# Patient Record
Sex: MALE | Employment: OTHER | URBAN - METROPOLITAN AREA
[De-identification: names, ages, dates, MRNs, and addresses within clinical notes are randomized per-mention and may not be internally consistent; named-entity substitution may affect disease eponyms.]

---

## 2017-01-30 ENCOUNTER — HOSPITAL ENCOUNTER (OUTPATIENT)
Dept: DIABETES SERVICES | Age: 52
Discharge: HOME | End: 2017-01-30
Payer: COMMERCIAL

## 2017-01-30 DIAGNOSIS — Z79.4 UNCONTROLLED TYPE 2 DIABETES MELLITUS WITH HYPERGLYCEMIA, WITH LONG-TERM CURRENT USE OF INSULIN (HCC): ICD-10-CM

## 2017-01-30 DIAGNOSIS — E11.65 UNCONTROLLED TYPE 2 DIABETES MELLITUS WITH HYPERGLYCEMIA, WITH LONG-TERM CURRENT USE OF INSULIN (HCC): ICD-10-CM

## 2017-01-30 PROCEDURE — G0109 DIAB MANAGE TRN IND/GROUP: HCPCS | Performed by: DIETITIAN, REGISTERED

## 2017-03-02 ENCOUNTER — OFFICE VISIT (OUTPATIENT)
Dept: ENDOCRINOLOGY | Age: 52
End: 2017-03-02

## 2017-03-02 VITALS
SYSTOLIC BLOOD PRESSURE: 128 MMHG | DIASTOLIC BLOOD PRESSURE: 82 MMHG | WEIGHT: 210.6 LBS | BODY MASS INDEX: 29.48 KG/M2 | HEART RATE: 69 BPM | HEIGHT: 71 IN

## 2017-03-02 DIAGNOSIS — E11.9 TYPE 2 DIABETES MELLITUS WITHOUT COMPLICATION, WITHOUT LONG-TERM CURRENT USE OF INSULIN (HCC): Primary | ICD-10-CM

## 2017-03-02 DIAGNOSIS — E78.2 MIXED HYPERLIPIDEMIA: ICD-10-CM

## 2017-03-02 RX ORDER — ATORVASTATIN CALCIUM 20 MG/1
TABLET, FILM COATED ORAL
Qty: 30 TAB | Refills: 11 | Status: SHIPPED | OUTPATIENT
Start: 2017-03-02 | End: 2017-09-12 | Stop reason: SDUPTHER

## 2017-03-02 RX ORDER — BLOOD SUGAR DIAGNOSTIC
STRIP MISCELLANEOUS
Refills: 3 | COMMUNITY
Start: 2017-01-17 | End: 2017-12-20 | Stop reason: SDUPTHER

## 2017-03-02 RX ORDER — ATORVASTATIN CALCIUM 20 MG/1
TABLET, FILM COATED ORAL
Refills: 6 | COMMUNITY
Start: 2017-02-07 | End: 2017-03-02 | Stop reason: SDUPTHER

## 2017-03-02 NOTE — PROGRESS NOTES
Chief Complaint   Patient presents with    Diabetes     pcp and pharmacy verified   Records reviewed  History of Present Illness: Uday Vega is a 46 y.o. male who I was asked to see in consult by Dr. Christianne Childers, for evaluation of diabetes. Was diagnosed with diabetes in November 2016. He has lost 50 pounds and was having issues of polyuria and polydipsia. He went to see his PCP and his BG was >600. Current regimen is Janumet 50/1000mg BID. He was previously or Dapagliflozin, but this caused dysuria so he stopped. Checks blood sugars twice daily. His FBGs are typically in the 100-130's though he has had some BGs in the 150-200's based on what he ate the night before. Most recent Hgb A1c was 7.2% in January 2017. Pt notes that in November 2016 his A1C was 14.4%, he has since lost 50 pounds and has been attending classes with the [facility]. Will request records from Dr. Kvng Rodas. A typical day is as follows:  Pt wakes around 6AM  - breakfast: He has breakfast around 7AM, yesterday he had eggs and coffee (1/2-1/2 and honey)  - He does not have a mid-morning snack.  - lunch: He has lunch around Noon-1PM, yesterday he had chicken salad (no bread) no side items and water  - He will have an afternoon snack of cashews.  - dinner: He has dinner around 7PM, last night he had chicken noodle soup and water. - He may occasionally have some deli meat for a snack in the evening. He has made a lot of changes to his diet including reducing portions and eating less pasta stopped drinking sodas. 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 He walks 2-3 miles most days. 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 He notes his energy has improved since he lost 50+ pounds. His goal is 190. No history of vascular disease. No history of retinopathy, neuropathy, or nephropathy. Last eye exam was November 2016.  He notes he needed new glasses because of changes in his BGs. Current Outpatient Prescriptions   Medication Sig    ONETOUCH ULTRA TEST strip USE AS DIRECTED    SITagliptin-metFORMIN (JANUMET) 50-1,000 mg per tablet Take 1 Tab by mouth two (2) times daily (with meals).  atorvastatin (LIPITOR) 20 mg tablet TAKE 1 TABLET ONCE A DAY ORALLY 30 DAY(S)     No current facility-administered medications for this visit. Allergies   Allergen Reactions    Erythromycin Anaphylaxis    Farxiga [Dapagliflozin] Other (comments)     Dysuria. Very uncomfortable     Family History   Problem Relation Age of Onset    No Known Problems Mother     Elevated Lipids Father     Kidney Disease Brother      s/p renal transplant (NSAIDS)    Diabetes Paternal Aunt     Diabetes Paternal Uncle     Cancer Maternal Grandfather      Prostate    Diabetes Paternal Grandfather      Social History     Social History    Marital status: UNKNOWN     Spouse name: N/A    Number of children: N/A    Years of education: N/A     Occupational History    Not on file.      Social History Main Topics    Smoking status: Current Some Day Smoker    Smokeless tobacco: Not on file      Comment: Cigars 2 days per week    Alcohol use 1.2 oz/week     2 Glasses of wine per week    Drug use: No    Sexual activity: Not on file     Other Topics Concern    Not on file     Social History Narrative     Review of Systems:  - Constitutional Symptoms: no fevers, chills, weight loss  - Eyes: no blurry vision or double vision  - Cardiovascular: no chest pain or palpitations  - Respiratory: no cough or shortness of breath  - Gastrointestinal: no dysphagia or abdominal pain  - Musculoskeletal: no joint pains or weakness  - Integumentary: no rashes  - Neurological: no numbness, tingling, or headaches  - Psychiatric: no depression or anxiety  - Endocrine: no heat or cold intolerance, no polyuria or polydipsia    Physical Examination:  Blood pressure 128/82, pulse 69, height 5' 11\" (1.803 m), weight 210 lb 9.6 oz (95.5 kg). - General: pleasant, no distress, good eye contact  - HEENT: no exopthalmos, no periorbital edema, no scleral/conjunctival injection, EOMI, no lid lag or stare  - Neck: supple, no thyromegaly, masses, lymph nodes, or carotid bruits, no supraclavicular or dorsocervical fat pads  - Cardiovascular: regular, normal rate, normal S1 and S2, no murmurs/rubs/gallops, 2+ dorsalis pedis pulses bilaterally  - Respiratory: clear to auscultation bilaterally  - Gastrointestinal: soft, nontender, nondistended, no masses, no hepatosplenomegaly  - Musculoskeletal: no proximal muscle weakness in upper or lower extremities  - Integumentary: no acanthosis nigricans, no abdominal striae, no rashes, no edema, no foot ulcers  - Neurological: intact sensation to monofilament 4/4 locations, intact vibratory sensation at great toes bilaterally,   - Psychiatric: normal mood and affect    Data Reviewed:   - Hgb A1c 7.2%  - lipids: total 281 ,  HDL 34, , LDL x  - ALT 13, AST 13  - BUN/Cr 17/1.0  - TSH 2.288    Assessment/Plan:   1. Type 2 diabetes mellitus without complication, without long-term current use of insulin ([address]    2. Mixed hyperlipidemia    1) Pt's A1C has improved with lifestyle changes and the Janumet. Pt encouraged to keep up the good work and continue the Januvia/Metformin 50/1000mg BID. Will check a fructosamine and Urine MA today. Pt to check his BGs twice daily. 2) Pt is taking the Atorvastatin 20mg daily, which he is tolerating well. With no hx of CAD and the recommendation is moderate intensity statin, which Atorvastatin 20mg fulfills. Pt voices understanding and agreement with the plan. RTC 3 months      Follow-up Disposition:  Return in about 3 months (around 6/2/2017).     Copy sent to:  Dr. Joan Van

## 2017-03-02 NOTE — LETTER
3/2/2017 10:30 AM 
 
Patient:  Broderick Polanco YOB: 1965 Date of Visit: 3/2/2017 Dear Aamir Martin MD 
[facility] [address] VIA Facsimile: [phone] 
 : Thank you for referring Mr. Broderick Polanco to me for evaluation/treatment. Below are the relevant portions of my assessment and plan of care. If you have questions, please do not hesitate to call me. I look forward to following Mr. Savita Todd along with you. Sincerely, Jacqueline Cunha MD

## 2017-03-02 NOTE — MR AVS SNAPSHOT
Visit Information Date & Time Provider Department Dept. Phone Encounter #  
 3/2/2017  9:50 AM Destinee Russell [facility] End[facility] 21  Follow-up Instructions Return in about 3 months (around 6/2/2017). Upcoming Health Maintenance Date Due Hepatitis C Screening 1965 HEMOGLOBIN A1C Q6M 1965 LIPID PANEL Q1 1965 FOOT EXAM Q1 7/10/1973 MICROALBUMIN Q1 7/10/1973 EYE EXAM RETINAL OR DILATED Q1 7/10/1973 Pneumococcal 19-64 Medium Risk (1 of 1 - PPSV23) 7/10/1982 DTaP/Tdap/Td series (1 - Tdap) 7/10/1984 FOBT Q 1 YEAR AGE 50-75 7/10/2013 INFLUENZA AGE 9 TO ADULT 8/1/2016 Allergies as of 3/2/2017  Review Complete On: 3/2/2017 By: Oneil Carrington MD  
  
 Severity Noted Reaction Type Reactions Erythromycin High 03/28/2016    Anaphylaxis Nydia Heckeiffer  03/02/2017    Other (comments) Dysuria. Very uncomfortable Current Immunizations  Never Reviewed No immunizations on file. Not reviewed this visit You Were Diagnosed With   
  
 Codes Comments Type 2 diabetes mellitus without complication, without long-term current use of insulin (HCC)    -  Primary ICD-10-CM: E11.9 ICD-9-CM: 250.00 Mixed hyperlipidemia     ICD-10-CM: E78.2 ICD-9-CM: 272.2 Vitals BP  
  
  
  
  
  
 128/82 Vitals History BMI and BSA Data Body Mass Index Body Surface Area  
 29.37 kg/m 2 2.19 m 2 Preferred Pharmacy Pharmacy Name Phone [id number] [address] [address] [place] [address] [phone] Your Updated Medication List  
  
   
This list is accurate as of: 3/2/17 10:30 AM.  Always use your most recent med list.  
  
  
  
  
 atorvastatin 20 mg tablet Commonly known as:  LIPITOR  
TAKE 1 TABLET ONCE A DAY ORALLY 30 DAY(S) ONETOUCH ULTRA TEST strip Generic drug:  glucose blood VI test strips USE AS DIRECTED SITagliptin-metFORMIN 50-1,000 mg per tablet Commonly known as:  Hernan Sheth Take 1 Tab by mouth two (2) times daily (with meals). Prescriptions Sent to Pharmacy Refills SITagliptin-metFORMIN (JANUMET) 50-1,000 mg per tablet 3 Sig: Take 1 Tab by mouth two (2) times daily (with meals). Class: Normal  
 Pharmacy: [facility]/pharmacy #[id number] Ramon [address], [address] Ph #: [phone] Route: Oral  
 atorvastatin (LIPITOR) 20 mg tablet 11 Sig: TAKE 1 TABLET ONCE A DAY ORALLY 30 DAY(S) Class: Normal  
 Pharmacy: [facility]/pharmacy #[id number] Ramon Boone, [address] Ph #: [phone] We Performed the Following FRUCTOSAMINE [75803 CPT(R)] HM DIABETES FOOT EXAM [HM7 Custom] MICROALBUMIN, UR, RAND W/ MICROALBUMIN/CREA RATIO [id number] CPT(R)] DC COLLECTION VENOUS BLOOD,VENIPUNCTURE Z2376359 CPT(R)] DC HANDLG&/OR CONVEY OF SPEC FOR TR OFFICE TO LAB [33441 CPT(R)] Follow-up Instructions Return in about 3 months (around 6/2/2017). To-Do List   
 07/14/2017 9:00 AM  
  Appointment with [facility] DIABETES CLASS #4 at [address] [place] [address] ([phone]) Introducing [facility]! Cheyenne Carr introduces [employer] patient portal. Now you can access parts of your medical record, email your doctor's office, and request medication refills online. 1. In your internet browser, go to [email or web address] 2. Click on the First Time User? Click Here link in the Sign In box. You will see the New Member Sign Up page. 3. Enter your [employer] Access Code exactly as it appears below. You will not need to use this code after youve completed the sign-up process. If you do not sign up before the expiration date, you must request a new code. · [employer] Access Code: [email or web address] Expires: 3/5/2017  2:31 PM 
 
 4. Enter the last four digits of your Social Security Number (xxxx) and Date of Birth (mm/dd/yyyy) as indicated and click Submit. You will be taken to the next sign-up page. 5. Create a [employer] ID. This will be your [employer] login ID and cannot be changed, so think of one that is secure and easy to remember. 6. Create a [employer] password. You can change your password at any time. 7. Enter your Password Reset Question and Answer. This can be used at a later time if you forget your password. 8. Enter your e-mail address. You will receive e-mail notification when new information is available in [address] 9. Click Sign Up. You can now view and download portions of your medical record. 10. Click the Download Summary menu link to download a portable copy of your medical information. If you have questions, please visit the Frequently Asked Questions section of the [employer] website. Remember, [employer] is NOT to be used for urgent needs. For medical emergencies, dial 911. Now available from your iPhone and Android! Please provide this summary of care documentation to your next provider. Your primary care clinician is listed as Nai Monk. If you have any questions after today's visit, please call [phone].

## 2017-03-03 LAB
ALBUMIN/CREAT UR: 5.9 MG/G CREAT (ref 0–30)
CREAT UR-MCNC: 109 MG/DL
FRUCTOSAMINE SERPL-SCNC: 265 UMOL/L (ref 0–285)
MICROALBUMIN UR-MCNC: 6.4 UG/ML

## 2017-03-03 NOTE — PROGRESS NOTES
Spoke with pt regarding his labs. No evidence of microalbuminuria. His Fructosamine of 265 is equivalent to an A1C of about 6.5% or an average blood sugar of around 135. Pt encouraged to keep up the good work!

## 2017-09-07 ENCOUNTER — OFFICE VISIT (OUTPATIENT)
Dept: ENDOCRINOLOGY | Age: 52
End: 2017-09-07

## 2017-09-07 VITALS
BODY MASS INDEX: 31.89 KG/M2 | DIASTOLIC BLOOD PRESSURE: 85 MMHG | HEART RATE: 66 BPM | SYSTOLIC BLOOD PRESSURE: 124 MMHG | HEIGHT: 71 IN | WEIGHT: 227.8 LBS

## 2017-09-07 DIAGNOSIS — E11.9 TYPE 2 DIABETES MELLITUS WITHOUT COMPLICATION, WITHOUT LONG-TERM CURRENT USE OF INSULIN (HCC): Primary | ICD-10-CM

## 2017-09-07 DIAGNOSIS — E78.2 MIXED HYPERLIPIDEMIA: ICD-10-CM

## 2017-09-07 LAB — HBA1C MFR BLD HPLC: 6.6 %

## 2017-09-07 NOTE — PROGRESS NOTES
Chief Complaint   Patient presents with    Diabetes     pcp and pharmacy verified. Eye exam UTD   Records since last visit reviewed  History of Present Illness: Tu Rob is a 45 y.o. male here for follow up of diabetes. He was diagnosed with diabetes in November 2016. He had lost 50 pounds and was having issues of polyuria and polydipsia. He went to see his PCP and his BG was >600. At our initial visit in March 2017 his regimen consisted of Janumet 50/1000mg BID. He was previously or Dapagliflozin, but this caused dysuria so he stopped. His Fructosamine  Was 265 which was equivalent to an A1C of 6.5% so pt was encouraged to keep up the excellent work. His A1C today was 6.6%. Pt has gained 17 pounds since March 2017. Pt notes that he is struggling with discipline and dieting. He notes that when he is not eating properly his BGs can run in the 200 range, but he \"can control it when he tries\". He notes that his blurry vision has improved and he had two teeth extracted \"that needed to come out for a long time. \"  He notes the polyuria has resolved as well, but when he does \"over indulge\" he will have some sluggishness. 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 He is still taking the Janumet 50/1000mg BID. He checks his BG 2 times per week \"when my wife asks if I have checked it\". When he does check it can run in the 130-220's. A typical day is as follows:  - breakfast: He has breakfast around 930AM, he has breakfast 3-4 days per week. Yesterday he had whole oat pancake with syrup and coffee (1/2-1/2 and honey)  - He does not have a mid-morning snack.  - lunch: He has lunch around 1-2PM, yesterday he had a bagel with egg and sausage and iced tea. - He will have an afternoon snack of cashews.  - dinner: He has dinner around 7PM, last night he had a chicken sandwich and water. - He may occasionally have some deli meat for a snack in the evening.      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 He walks 2-3 miles most days. 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 No history of vascular disease. No history of retinopathy, neuropathy, or nephropathy. Last eye exam was April 2017, no retinopathy (record reviewed). Current Outpatient Prescriptions   Medication Sig    ONETOUCH ULTRA TEST strip USE AS DIRECTED    SITagliptin-metFORMIN (JANUMET) 50-1,000 mg per tablet Take 1 Tab by mouth two (2) times daily (with meals).  atorvastatin (LIPITOR) 20 mg tablet TAKE 1 TABLET ONCE A DAY ORALLY 30 DAY(S)     No current facility-administered medications for this visit. Allergies   Allergen Reactions    Erythromycin Anaphylaxis    Farxiga [Dapagliflozin] Other (comments)     Dysuria. Very uncomfortable     Review of Systems:  - Eyes: no blurry vision or double vision  - Cardiovascular: no chest pain  - Respiratory: no shortness of breath  - Musculoskeletal: no myalgias  - Neurological: no numbness/tingling in extremities    Physical Examination:  Blood pressure 124/85, pulse 66, height 5' 11\" (1.803 m), weight 227 lb 12.8 oz (103.3 kg). - General: pleasant, no distress, good eye contact   - Neck: no carotid bruits  - Cardiovascular: regular, normal rate, nl s1 and s2, no m/r/g, 2+ DP pulses   - Respiratory: clear bilaterally  - Integumentary: no edema, no foot ulcers, sensation to monofilament and vibration intact bilaterally  - Psychiatric: normal mood and affect    Data Reviewed:   - His A1C today was 6.6%    Assessment/Plan:   1) DM > His A1C today was 6.6%. Pt notes he is still struggling with \"dietary indiscretions\". His A1C is stable, but he has had some weight gain. Pt encouraged to work on the lifestyle changes and cutting back on the carbs and starches. For now will continue the Janumet 50/1000mg BID. Pt to check his BGs twice daily.     2) HLD > Pt as not been taking the Atorvastatin 20mg daily for a couple of months. Will check his lipid panel today. With no hx of CAD, the recommendation is moderate intensity statin, which Atorvastatin 20mg fulfills. Once these labs come back we can discuss restarting the statin. Pt voices understanding and agreement with the plan. RTC 3 months      Follow-up Disposition:  Return in about 3 months (around 12/7/2017).     Copy sent to:  Dr. Morse Homes

## 2017-09-07 NOTE — MR AVS SNAPSHOT
Visit Information Date & Time Provider Department Dept. Phone Encounter #  
 9/7/2017  9:10 AM Asha Rosales, [address] [facility] Diabetes [facility] Endocrinology [phone] [id number] Follow-up Instructions Return in about 3 months (around 12/7/2017). Upcoming Health Maintenance Date Due Hepatitis C Screening 1965 Pneumococcal 19-64 Medium Risk (1 of 1 - PPSV23) 7/10/1982 DTaP/Tdap/Td series (1 - Tdap) 7/10/1984 FOBT Q 1 YEAR AGE 50-75 7/10/2013 HEMOGLOBIN A1C Q6M 5/15/2017 INFLUENZA AGE 9 TO ADULT 8/1/2017 LIPID PANEL Q1 11/15/2017 FOOT EXAM Q1 3/2/2018 MICROALBUMIN Q1 3/2/2018 EYE EXAM RETINAL OR DILATED Q1 3/29/2018 Allergies as of 9/7/2017  Review Complete On: 9/7/2017 By: Renata Burks LPN Severity Noted Reaction Type Reactions Erythromycin High 03/28/2016    Anaphylaxis Roosvelt Bradley  03/02/2017    Other (comments) Dysuria. Very uncomfortable Current Immunizations  Never Reviewed No immunizations on file. Not reviewed this visit You Were Diagnosed With   
  
 Codes Comments Type 2 diabetes mellitus without complication, without long-term current use of insulin (HCC)    -  Primary ICD-10-CM: E11.9 ICD-9-CM: 250.00 Mixed hyperlipidemia     ICD-10-CM: E78.2 ICD-9-CM: 272.2 Vitals BP Pulse Height(growth percentile) Weight(growth percentile) BMI Smoking Status 124/85 66 5' 11\" (1.803 m) 227 lb 12.8 oz (103.3 kg) 31.77 kg/m2 Current Some Day Smoker BMI and BSA Data Body Mass Index Body Surface Area 31.77 kg/m 2 2.27 m 2 Preferred Pharmacy [facility]acy Name Phone [id number] [address] [place] [address] [phone] Your Updated Medication List  
  
   
This list is accurate as of: 9/7/17  9:45 AM.  Always use your most recent med list.  
  
  
  
  
 atorvastatin 20 mg tablet Commonly known as:  LIPITOR TAKE 1 TABLET ONCE A DAY ORALLY 30 DAY(S) ONETOUCH ULTRA TEST strip Generic drug:  glucose blood VI test strips USE AS DIRECTED SITagliptin-metFORMIN 50-1,000 mg per tablet Commonly known as:  Anitra Cowden Take 1 Tab by mouth two (2) times daily (with meals). We Performed the Following AMB POC HEMOGLOBIN A1C [75714 CPT(R)]  DIABETES FOOT EXAM [HM7 Custom] LDL, DIRECT [id number] CPT(R)] LIPID PANEL [79462 CPT(R)] DE COLLECTION VENOUS BLOOD,VENIPUNCTURE [id number] CPT(R)] SPECIMEN HANDLING, OFF->LAB [id number] CPT(R)] Follow-up Instructions Return in about 3 months (around 12/7/2017). Introducing [facility]! Keara De La Fuente introduces [employer] patient portal. Now you can access parts of your medical record, email your doctor's office, and request medication refills online. 1. In your internet browser, go to [email or web address] 2. Click on the First Time User? Click Here link in the Sign In box. You will see the New Member Sign Up page. 3. Enter your [employer] Access Code exactly as it appears below. You will not need to use this code after youve completed the sign-up process. If you do not sign up before the expiration date, you must request a new code. · [employer] Access Code: [email or web address] Expires: 12/6/2017  9:45 AM 
 
4. Enter the last four digits of your Social Security Number (xxxx) and Date of Birth (mm/dd/yyyy) as indicated and click Submit. You will be taken to the next sign-up page. 5. Create a [employer] ID. This will be your [employer] login ID and cannot be changed, so think of one that is secure and easy to remember. 6. Create a [employer] password. You can change your password at any time. 7. Enter your Password Reset Question and Answer. This can be used at a later time if you forget your password. 8. Enter your e-mail address. You will receive e-mail notification when new information is available in [address] 9. Click Sign Up. You can now view and download portions of your medical record. 10. Click the Download Summary menu link to download a portable copy of your medical information. If you have questions, please visit the Frequently Asked Questions section of the [employer] website. Remember, [employer] is NOT to be used for urgent needs. For medical emergencies, dial 911. Now available from your iPhone and Android! Please provide this summary of care documentation to your next provider. Your primary care clinician is listed as Ritesh Jurado. If you have any questions after today's visit, please call [phone].

## 2017-09-08 LAB
CHOLEST SERPL-MCNC: 245 MG/DL (ref 100–199)
HDLC SERPL-MCNC: 54 MG/DL
INTERPRETATION, 910389: NORMAL
LDLC SERPL CALC-MCNC: 167 MG/DL (ref 0–99)
LDLC SERPL DIRECT ASSAY-MCNC: 175 MG/DL (ref 0–99)
TRIGL SERPL-MCNC: 119 MG/DL (ref 0–149)
VLDLC SERPL CALC-MCNC: 24 MG/DL (ref 5–40)

## 2017-09-08 NOTE — PROGRESS NOTES
His cholesterol levels are back and they have increased a lot since he stopped the Lipitor. With his history of diabetes the because of how high his cholesterol levels are I strongly recommend he restart taking the Lipitor 20mg in the evening. Does he need a new prescription?

## 2017-09-12 RX ORDER — ATORVASTATIN CALCIUM 20 MG/1
TABLET, FILM COATED ORAL
Qty: 30 TAB | Refills: 11 | Status: SHIPPED | OUTPATIENT
Start: 2017-09-12 | End: 2017-12-20 | Stop reason: SDUPTHER

## 2017-10-06 RX ORDER — SITAGLIPTIN AND METFORMIN HYDROCHLORIDE 50; 1000 MG/1; MG/1
TABLET, FILM COATED ORAL
Qty: 60 TAB | Refills: 3 | Status: SHIPPED | OUTPATIENT
Start: 2017-10-06 | End: 2017-11-04 | Stop reason: SDUPTHER

## 2017-11-04 RX ORDER — SITAGLIPTIN AND METFORMIN HYDROCHLORIDE 50; 1000 MG/1; MG/1
TABLET, FILM COATED ORAL
Qty: 60 TAB | Refills: 3 | Status: SHIPPED | OUTPATIENT
Start: 2017-11-04 | End: 2017-12-20 | Stop reason: SDUPTHER

## 2017-12-20 ENCOUNTER — OFFICE VISIT (OUTPATIENT)
Dept: ENDOCRINOLOGY | Age: 52
End: 2017-12-20

## 2017-12-20 ENCOUNTER — TELEPHONE (OUTPATIENT)
Dept: ENDOCRINOLOGY | Age: 52
End: 2017-12-20

## 2017-12-20 VITALS
WEIGHT: 230.4 LBS | BODY MASS INDEX: 32.26 KG/M2 | HEART RATE: 83 BPM | HEIGHT: 71 IN | SYSTOLIC BLOOD PRESSURE: 110 MMHG | DIASTOLIC BLOOD PRESSURE: 77 MMHG

## 2017-12-20 DIAGNOSIS — E11.9 TYPE 2 DIABETES MELLITUS WITHOUT COMPLICATION, WITHOUT LONG-TERM CURRENT USE OF INSULIN (HCC): Primary | ICD-10-CM

## 2017-12-20 DIAGNOSIS — E78.2 MIXED HYPERLIPIDEMIA: ICD-10-CM

## 2017-12-20 LAB — HBA1C MFR BLD HPLC: 7.3 %

## 2017-12-20 RX ORDER — BLOOD SUGAR DIAGNOSTIC
STRIP MISCELLANEOUS
Qty: 200 STRIP | Refills: 3 | Status: SHIPPED | OUTPATIENT
Start: 2017-12-20

## 2017-12-20 RX ORDER — ATORVASTATIN CALCIUM 20 MG/1
TABLET, FILM COATED ORAL
Qty: 90 TAB | Refills: 3 | Status: SHIPPED | OUTPATIENT
Start: 2017-12-20

## 2017-12-20 NOTE — MR AVS SNAPSHOT
Visit Information Date & Time Provider Department Dept. Phone Encounter #  
 12/20/2017  2:30 PM Danette Batista, [address] [place] [address] Diabetes and Endocrinology [phone] [id number] Follow-up Instructions Return in about 4 months (around 4/20/2018). Upcoming Health Maintenance Date Due Hepatitis C Screening 1965 Pneumococcal 19-64 Medium Risk (1 of 1 - PPSV23) 7/10/1982 DTaP/Tdap/Td series (1 - Tdap) 7/10/1984 FOBT Q 1 YEAR AGE 50-75 7/10/2013 Influenza Age 5 to Adult 8/1/2017 MICROALBUMIN Q1 3/2/2018 HEMOGLOBIN A1C Q6M 3/7/2018 EYE EXAM RETINAL OR DILATED Q1 3/29/2018 FOOT EXAM Q1 9/7/2018 LIPID PANEL Q1 9/7/2018 Allergies as of 12/20/2017  Review Complete On: 12/20/2017 By: Danette Batista MD  
  
 Severity Noted Reaction Type Reactions Erythromycin High 03/28/2016    Anaphylaxis Kirk Knights  03/02/2017    Other (comments) Dysuria. Very uncomfortable Current Immunizations  Never Reviewed No immunizations on file. Not reviewed this visit You Were Diagnosed With   
  
 Codes Comments Type 2 diabetes mellitus without complication, without long-term current use of insulin (HCC)    -  Primary ICD-10-CM: E11.9 ICD-9-CM: 250.00 Mixed hyperlipidemia     ICD-10-CM: E78.2 ICD-9-CM: 272.2 Vitals BP Pulse Height(growth percentile) Weight(growth percentile) BMI Smoking Status 110/77 83 5' 11\" (1.803 m) 230 lb 6.4 oz (104.5 kg) 32.13 kg/m2 Current Some Day Smoker Vitals History BMI and BSA Data Body Mass Index Body Surface Area  
 32.13 kg/m 2 2.29 m 2 Preferred Pharmacy Pharmacy Name Phone [id number] [address] [address] [address] [phone] Your Updated Medication List  
  
   
This list is accurate as of: 12/20/17  2:41 PM.  Always use your most recent med list.  
  
  
  
  
 atorvastatin 20 mg tablet Commonly known as:  LIPITOR  
TAKE 1 TABLET ONCE A DAY ORALLY 30 DAY(S) ONETOUCH ULTRA TEST strip Generic drug:  glucose blood VI test strips Check blood sugar twice per day SITagliptin-metFORMIN 50-1,000 mg per tablet Commonly known as:  JANUMET  
TAKE 1 TAB BY MOUTH TWO (2) TIMES DAILY (WITH MEALS). Prescriptions Sent to Pharmacy Refills SITagliptin-metFORMIN (JANUMET) 50-1,000 mg per tablet 3 Sig: TAKE 1 TAB BY MOUTH TWO (2) TIMES DAILY (WITH MEALS). Class: Normal  
 Pharmacy: [id number] [address], [address] #: [phone]  
 atorvastatin (LIPITOR) 20 mg tablet 3 Sig: TAKE 1 TABLET ONCE A DAY ORALLY 30 DAY(S) Class: Normal  
 Pharmacy: [facility][id number] [address], [address] #: [phone] ONETOUCH ULTRA TEST strip 3 Sig: Check blood sugar twice per day Class: Normal  
 Pharmacy: [facility] #[id number] [address] #: [phone] We Performed the Following AMB POC HEMOGLOBIN A1C [81105 CPT(R)] HM DIABETES FOOT EXAM [HM7 Custom] Follow-up Instructions Return in about 4 months (around 4/20/2018). Introducing [facility]! Salina Robertson introduces [employer] patient portal. Now you can access parts of your medical record, email your doctor's office, and request medication refills online. 1. In your internet browser, go to [email or web address] 2. Click on the First Time User? Click Here link in the Sign In box. You will see the New Member Sign Up page. 3. Enter your [employer] Access Code exactly as it appears below. You will not need to use this code after youve completed the sign-up process. If you do not sign up before the expiration date, you must request a new code. · [employer] Access Code: [email or web address] Expires: 3/20/2018  2:41 PM 
 
 4. Enter the last four digits of your Social Security Number (xxxx) and Date of Birth (mm/dd/yyyy) as indicated and click Submit. You will be taken to the next sign-up page. 5. Create a [employer] ID. This will be your [employer] login ID and cannot be changed, so think of one that is secure and easy to remember. 6. Create a [employer] password. You can change your password at any time. 7. Enter your Password Reset Question and Answer. This can be used at a later time if you forget your password. 8. Enter your e-mail address. You will receive e-mail notification when new information is available in [address] 9. Click Sign Up. You can now view and download portions of your medical record. 10. Click the Download Summary menu link to download a portable copy of your medical information. If you have questions, please visit the Frequently Asked Questions section of the [employer] website. Remember, [employer] is NOT to be used for urgent needs. For medical emergencies, dial 911. Now available from your iPhone and Android! Please provide this summary of care documentation to your next provider. Your primary care clinician is listed as [facility]. If you have any questions after today's visit, please call [phone].

## 2017-12-20 NOTE — PROGRESS NOTES
Chief Complaint   Patient presents with    Diabetes     pcp and pharmacy verified. Eye exam UTD   Records since last visit reviewed  History of Present Illness: Florian Gordon is a 45 y.o. male here for follow up of diabetes. He was diagnosed with diabetes in November 2016. He had lost 50 pounds and was having issues of polyuria and polydipsia. He went to see his PCP and his BG was >600. At his last visit in September 2017 his A1C wsa 6.6% on Janumet 50/1000mg BID. He was struggling with \"dietary indiscretions\" and had some weight gain. Pt encouraged to continue the Janumet and to work on the lifestyle changes. His A1C today was 7.2%. Pt has gained an additional 3 pounds since September 2017. Pt is still taking the Janumet 50/1000mg BID. He will miss a dose 2-3 times per week. He has not been checking his BGs regularly. He notes he has been fighting a GI bug and his BGs, when he checked were in the 300 range. He notes he is not as physically active as in the past.   He notes he is doing more computer work. A typical day is as follows:  - breakfast: He has breakfast around 930AM, this AM he had grilled chicken with peppers and onions and coffee (2% milk and honey)  - He does not have a mid-morning snack.  - lunch: He has lunch around 2PM, yesterday he had chicken and water. - He will have an afternoon snack of cashews.  - dinner: He has dinner around 7PM, last night he had a chicken with peppers and onions and water. - He may occasionally have some deli meat for a snack in the evening. He is not doing the walking as much. 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 No history of vascular disease. No history of neuropathy, or nephropathy. Last eye exam was April 2017, no retinopathy (record reviewed). In September 2017 he lipid panel has increased, pt was not taking his Atorvastatin 20mg daily. I emphasized the importance of taking his Atorvastatin 20mg daily.   He has restarted his Atorvastatin 20mg daily. Current Outpatient Prescriptions   Medication Sig    SITagliptin-metFORMIN (JANUMET) 50-1,000 mg per tablet TAKE 1 TAB BY MOUTH TWO (2) TIMES DAILY (WITH MEALS).  atorvastatin (LIPITOR) 20 mg tablet TAKE 1 TABLET ONCE A DAY ORALLY 30 DAY(S)    ONETOUCH ULTRA TEST strip Check blood sugar twice per day     No current facility-administered medications for this visit. Allergies   Allergen Reactions    Erythromycin Anaphylaxis    Farxiga [Dapagliflozin] Other (comments)     Dysuria. Very uncomfortable     Review of Systems:  - Eyes: no blurry vision or double vision  - Cardiovascular: no chest pain  - Respiratory: no shortness of breath  - Musculoskeletal: no myalgias  - Neurological: no numbness/tingling in extremities    Physical Examination:  Blood pressure 110/77, pulse 83, height 5' 11\" (1.803 m), weight 230 lb 6.4 oz (104.5 kg). - General: pleasant, no distress, good eye contact   - Neck: no carotid bruits  - Cardiovascular: regular, normal rate, nl s1 and s2, no m/r/g, 2+ DP pulses   - Respiratory: clear bilaterally  - Integumentary: no edema, no foot ulcers, sensation to monofilament and vibration intact bilaterally  - Psychiatric: normal mood and affect    Data Reviewed:   - His A1C today was 7.2%    Assessment/Plan:   1) DM > His A1C today was 7.2%. Pt notes he is still struggling with \"dietary indiscretions\" and has been less active recently. Pt encouraged to work on the physical activity and lowering his carb intake. For now will continue the Janumet 50/1000mg BID and we discussed alternative agents if his BGs do not improve  Pt to check his BGs twice daily. 2) HLD > With no hx of CAD, the recommendation is moderate intensity statin, which Atorvastatin 20mg fulfills. He has restarted his Atorvastatin 20mg daily. Pt to continue the Atorvastatin 20mg daily. Pt voices understanding and agreement with the plan.     Pt is going to be changing to [facility] \"exchange\" insurance in January, so I will not be able to see him again during 2018.  Pt encouraged to call as needed and to come back to see me in 2019 if he has not found a new endocrinologist.    Copy sent to:  Dr. Estrellita Avelar